# Patient Record
Sex: MALE | Race: WHITE | NOT HISPANIC OR LATINO
[De-identification: names, ages, dates, MRNs, and addresses within clinical notes are randomized per-mention and may not be internally consistent; named-entity substitution may affect disease eponyms.]

---

## 2019-03-11 ENCOUNTER — RECORD ABSTRACTING (OUTPATIENT)
Age: 59
End: 2019-03-11

## 2019-03-11 DIAGNOSIS — G89.29 CERVICALGIA: ICD-10-CM

## 2019-03-11 DIAGNOSIS — R29.898 OTHER SYMPTOMS AND SIGNS INVOLVING THE MUSCULOSKELETAL SYSTEM: ICD-10-CM

## 2019-03-11 DIAGNOSIS — R20.0 ANESTHESIA OF SKIN: ICD-10-CM

## 2019-03-11 DIAGNOSIS — F32.3 MAJOR DEPRESSIVE DISORDER, SINGLE EPISODE, SEVERE WITH PSYCHOTIC FEATURES: ICD-10-CM

## 2019-03-11 DIAGNOSIS — G56.03 CARPAL TUNNEL SYNDROM,BILATERAL UPPER LIMBS: ICD-10-CM

## 2019-03-11 DIAGNOSIS — R20.2 ANESTHESIA OF SKIN: ICD-10-CM

## 2019-03-11 DIAGNOSIS — Z87.898 PERSONAL HISTORY OF OTHER SPECIFIED CONDITIONS: ICD-10-CM

## 2019-03-11 DIAGNOSIS — Z86.69 PERSONAL HISTORY OF OTHER DISEASES OF THE NERVOUS SYSTEM AND SENSE ORGANS: ICD-10-CM

## 2019-03-11 DIAGNOSIS — Z81.8 FAMILY HISTORY OF OTHER MENTAL AND BEHAVIORAL DISORDERS: ICD-10-CM

## 2019-03-11 DIAGNOSIS — M54.2 CERVICALGIA: ICD-10-CM

## 2019-03-11 DIAGNOSIS — F51.02 ADJUSTMENT INSOMNIA: ICD-10-CM

## 2019-03-11 DIAGNOSIS — G58.7 MONONEURITIS MULTIPLEX: ICD-10-CM

## 2019-03-11 DIAGNOSIS — G47.30 SLEEP APNEA, UNSPECIFIED: ICD-10-CM

## 2019-03-11 RX ORDER — ATORVASTATIN CALCIUM 40 MG/1
40 TABLET, FILM COATED ORAL
Refills: 0 | Status: ACTIVE | COMMUNITY

## 2019-03-11 RX ORDER — ASPIRIN 81 MG/1
81 TABLET, CHEWABLE ORAL DAILY
Refills: 0 | Status: ACTIVE | COMMUNITY

## 2019-03-11 RX ORDER — ZOLPIDEM TARTRATE 10 MG/1
10 TABLET ORAL
Refills: 0 | Status: ACTIVE | COMMUNITY

## 2019-03-12 ENCOUNTER — APPOINTMENT (OUTPATIENT)
Dept: PHYSICAL MEDICINE AND REHAB | Facility: CLINIC | Age: 59
End: 2019-03-12
Payer: MEDICAID

## 2019-03-12 VITALS
HEIGHT: 67 IN | HEART RATE: 84 BPM | BODY MASS INDEX: 28.25 KG/M2 | SYSTOLIC BLOOD PRESSURE: 139 MMHG | WEIGHT: 180 LBS | DIASTOLIC BLOOD PRESSURE: 104 MMHG

## 2019-03-12 DIAGNOSIS — G99.2 SPINAL STENOSIS, CERVICAL REGION: ICD-10-CM

## 2019-03-12 DIAGNOSIS — G89.29 CERVICALGIA: ICD-10-CM

## 2019-03-12 DIAGNOSIS — M62.838 OTHER MUSCLE SPASM: ICD-10-CM

## 2019-03-12 DIAGNOSIS — G24.3 SPASMODIC TORTICOLLIS: ICD-10-CM

## 2019-03-12 DIAGNOSIS — G89.4 CHRONIC PAIN SYNDROME: ICD-10-CM

## 2019-03-12 DIAGNOSIS — Z98.1 ARTHRODESIS STATUS: ICD-10-CM

## 2019-03-12 DIAGNOSIS — M48.02 SPINAL STENOSIS, CERVICAL REGION: ICD-10-CM

## 2019-03-12 DIAGNOSIS — M25.511 PAIN IN RIGHT SHOULDER: ICD-10-CM

## 2019-03-12 DIAGNOSIS — M54.2 CERVICALGIA: ICD-10-CM

## 2019-03-12 PROCEDURE — 99215 OFFICE O/P EST HI 40 MIN: CPT

## 2019-03-12 RX ORDER — ONABOTULINUMTOXINA 200 [USP'U]/1
200 INJECTION, POWDER, LYOPHILIZED, FOR SOLUTION INTRADERMAL; INTRAMUSCULAR ONCE
Qty: 200 | Refills: 0 | Status: ACTIVE | OUTPATIENT
Start: 2019-03-12

## 2019-03-12 NOTE — REVIEW OF SYSTEMS
[Muscle Pain] : muscle pain [Muscle Weakness] : muscle weakness [Difficulty Walking] : no difficulty walking [Insomnia] : insomnia [Depression] : depression [Negative] : Heme/Lymph

## 2019-03-12 NOTE — ASSESSMENT
[FreeTextEntry1] : we discussed continuing BOTOX  injections  \par \par botox is on label and FDA approved for this condition and is quite safe and effective \par injections are repeated every 3 months \par \par frequent adverse reactions- usually self limited- can include dysphagia 19% (generally mild) URTI (12 %) neck pain 11% and headache 11%\par contraindications include aminoglycoside antibiotics,Neuromuscular transmitter medications\par  or Diseases such as myasthenia Gravis ,pregnancy or lactation \par protocol is such that botox naive patients start with lower dosages \par dosing is subsequently adjusted based on response body weight and muscle hypertrophy \par understanding of discussion endorsed\par meets the criteria for good candidate for neurotoxin \par to start authorization process\par to return once received\par respectfully request 200 units

## 2019-03-12 NOTE — HISTORY OF PRESENT ILLNESS
[FreeTextEntry1] : This is a patient referred by Dr Cornelius ([facility]) , spine surgeon last year for symptoms of cervical dystonia\par / R torticollis  with significant pain, disability and distress .\par \par clinical syndrome manifested after spine surgery  (f cervical stenosis , discopathy with myelopathy )\par Had decompression /anterior fusion R last year(Cloward procedure )\par \par he is on SSD for chronic pain syndrome and spine derangement persistent neuralgia uppers with weakness, numbness and tingling  in hands and forearms \par \par he had BOTOX injections last November with 25 % improvement of pain \par initial dosing low as botox naive -next injection cycle can increase dosage \par \par \par Cervical dystonia or torticollis syndrome is sustained involuntary cervical contraction associated with : \par 1 abnormal head shoulder posture-tilt or turn \par 2 pain because of overactive muscle activity\par 3 +/- abnormal movement -, jerking or turning -he pulls to the R \par \par \par Patient endorses that the movement disorder disappears during sleep \par  Increasing pain ,social embarrassment, and affecting ADL and quality of life \par There is no +FH known\par Patient denies other concomitant limb or central dystonias such as writers cramp,blepharospasm\par MRI brain/ other work up negative- to rule out other pathology \par 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

## 2019-03-12 NOTE — PHYSICAL EXAM
[Normal] : Oriented to person, place, and time, insight and judgement were intact and the affect was normal [de-identified] : torticolis with R neck pull spasm ++ trigeer pint very painful R lev scap post cervical paraspinal tenderness +++Passive ROM intact  [de-identified] : numbness and tingling fingers R> L  wasiting medial forearm pain over ulnar groove amd epicondyle clumsy fingers poor dexterity